# Patient Record
Sex: MALE
[De-identification: names, ages, dates, MRNs, and addresses within clinical notes are randomized per-mention and may not be internally consistent; named-entity substitution may affect disease eponyms.]

---

## 2021-03-30 PROBLEM — Z00.00 ENCOUNTER FOR PREVENTIVE HEALTH EXAMINATION: Status: ACTIVE | Noted: 2021-03-30

## 2021-04-02 ENCOUNTER — APPOINTMENT (OUTPATIENT)
Dept: UROLOGY | Facility: CLINIC | Age: 56
End: 2021-04-02
Payer: COMMERCIAL

## 2021-04-02 VITALS
DIASTOLIC BLOOD PRESSURE: 69 MMHG | TEMPERATURE: 97.5 F | HEIGHT: 63 IN | SYSTOLIC BLOOD PRESSURE: 106 MMHG | HEART RATE: 92 BPM

## 2021-04-02 DIAGNOSIS — Z12.5 ENCOUNTER FOR SCREENING FOR MALIGNANT NEOPLASM OF PROSTATE: ICD-10-CM

## 2021-04-02 DIAGNOSIS — N13.8 BENIGN PROSTATIC HYPERPLASIA WITH LOWER URINARY TRACT SYMPMS: ICD-10-CM

## 2021-04-02 DIAGNOSIS — Z87.39 PERSONAL HISTORY OF OTHER DISEASES OF THE MUSCULOSKELETAL SYSTEM AND CONNECTIVE TISSUE: ICD-10-CM

## 2021-04-02 DIAGNOSIS — N40.1 BENIGN PROSTATIC HYPERPLASIA WITH LOWER URINARY TRACT SYMPMS: ICD-10-CM

## 2021-04-02 PROCEDURE — 99204 OFFICE O/P NEW MOD 45 MIN: CPT

## 2021-04-02 PROCEDURE — 99072 ADDL SUPL MATRL&STAF TM PHE: CPT

## 2021-04-02 NOTE — PHYSICAL EXAM
[General Appearance - Well Developed] : well developed [Normal Appearance] : normal appearance [General Appearance - In No Acute Distress] : no acute distress [Heart Rate And Rhythm] : Heart rate and rhythm were normal [] : no respiratory distress [Abdomen Soft] : soft [Abdomen Hernia] : no hernia was discovered [Costovertebral Angle Tenderness] : no ~M costovertebral angle tenderness [Urethral Meatus] : meatus normal [Penis Abnormality] : normal circumcised penis [Epididymis] : the epididymides were normal [Testes Tenderness] : no tenderness of the testes [Testes Mass (___cm)] : there were no testicular masses [Prostate Tenderness] : the prostate was not tender [No Prostate Nodules] : no prostate nodules [Prostate Size ___ (0-4)] : prostate size [unfilled] (scale: 0-4) [Normal Station and Gait] : the gait and station were normal for the patient's age [Skin Color & Pigmentation] : normal skin color and pigmentation [No Focal Deficits] : no focal deficits [Oriented To Time, Place, And Person] : oriented to person, place, and time

## 2021-04-02 NOTE — HISTORY OF PRESENT ILLNESS
[FreeTextEntry1] : 56 yo male with hx of nocturia 2-3x/night which has become bothersome.  He denies daytime frequency, urgency, hematuria, or dysuria.  His urine stream is good.  He had an MR of his lower back because of chronic lower back pain and the MR incidentally noted an enlarged prostate.  He has not had a PSA in the past as far as he can remember and would like to get his annual CATY and PSA today. \par \par \par \par IPSS = 7; QoL = 3

## 2021-04-02 NOTE — ASSESSMENT
[FreeTextEntry1] : 56 yo male with BPH and LUTS.\par \par Lower urinary symptoms were reviewed including the potential etiologies of the patient's symptoms. The anatomy of the urinary tract was reviewed. Bladder, prostate, and urethral sources, as well as non-urologic sources, were discussed. Options for evaluation were discussed including cystoscopy, urodynamics, and pelvis ultrasonography. Management of symptoms were reviewed including no therapy, medical therapy, and surgical therapy. The long term sequelae of no treatment, including no adverse effects, potential for progressive lower urinary tract symptoms or deterioration, urinary retention, bladder dysfunction, and renal dysfunction were reviewed. \par \par Medical therapy for BPH (benign prostatic hyperplasia), or prostate enlargement, was reviewed including the physiology of medication therapy. Alpha blocker medication therapy was reviewed including adverse effects (including dizziness, hypotension, retrograde ejaculation, rhinitis, fatigue), proper usage, and contraindications. The use of 5 alpha reductase inhibitor medication therapy was reviewed including adverse effects (including decreased libido, erectile dysfunction, fatigue, reduction of PSA level), proper usage, and contraindications. Potential increased risk of high grade prostate malignancy was reviewed with 5 alpha reductase inhibitor medication. Combination medication therapy with alpha blocker and 5 alpha reductase inhibitor therapy was reviewed. \par \par He is reluctant to take an alpha-blocker because he is sensitive to medications that can cause dizziness.  He has had this side effect with many medication, and would prefer to avoid an alpha blocker.  He would like to try finasteride instead.  I would like to get his PSA result first before starting him on a 5ARI.\par \par He is due for his annual PSA screening.  We will send his PSA today.\par \par \par

## 2021-04-05 ENCOUNTER — NON-APPOINTMENT (OUTPATIENT)
Age: 56
End: 2021-04-05

## 2021-04-05 LAB
BACTERIA UR CULT: NORMAL
PSA FREE FLD-MCNC: 19 %
PSA FREE SERPL-MCNC: 0.16 NG/ML
PSA SERPL-MCNC: 0.87 NG/ML

## 2021-04-05 RX ORDER — FINASTERIDE 5 MG/1
5 TABLET, FILM COATED ORAL
Qty: 30 | Refills: 3 | Status: ACTIVE | COMMUNITY
Start: 2021-04-05 | End: 1900-01-01

## 2021-07-20 ENCOUNTER — TRANSCRIPTION ENCOUNTER (OUTPATIENT)
Age: 56
End: 2021-07-20

## 2021-07-27 ENCOUNTER — NON-APPOINTMENT (OUTPATIENT)
Age: 56
End: 2021-07-27